# Patient Record
Sex: FEMALE | Race: WHITE | URBAN - METROPOLITAN AREA
[De-identification: names, ages, dates, MRNs, and addresses within clinical notes are randomized per-mention and may not be internally consistent; named-entity substitution may affect disease eponyms.]

---

## 2018-09-18 ENCOUNTER — NEW PATIENT (OUTPATIENT)
Dept: URBAN - METROPOLITAN AREA CLINIC 44 | Facility: CLINIC | Age: 58
End: 2018-09-18
Payer: COMMERCIAL

## 2018-09-18 PROCEDURE — 92004 COMPRE OPH EXAM NEW PT 1/>: CPT | Performed by: OPTOMETRIST

## 2018-09-18 PROCEDURE — 92015 DETERMINE REFRACTIVE STATE: CPT | Performed by: OPTOMETRIST

## 2018-09-18 PROCEDURE — 92133 CPTRZD OPH DX IMG PST SGM ON: CPT | Performed by: OPTOMETRIST

## 2018-09-18 ASSESSMENT — INTRAOCULAR PRESSURE
OS: 24
OD: 23
OD: 21
OS: 21

## 2018-09-18 ASSESSMENT — VISUAL ACUITY
OD: 20/20
OS: 20/20

## 2018-09-18 ASSESSMENT — KERATOMETRY
OS: 44.75
OD: 44.75

## 2019-04-12 ENCOUNTER — FOLLOW UP ESTABLISHED (OUTPATIENT)
Dept: URBAN - METROPOLITAN AREA CLINIC 44 | Facility: CLINIC | Age: 59
End: 2019-04-12
Payer: COMMERCIAL

## 2019-04-12 PROCEDURE — 92012 INTRM OPH EXAM EST PATIENT: CPT | Performed by: OPTOMETRIST

## 2019-04-12 PROCEDURE — 92083 EXTENDED VISUAL FIELD XM: CPT | Performed by: OPTOMETRIST

## 2019-04-12 PROCEDURE — 76514 ECHO EXAM OF EYE THICKNESS: CPT | Performed by: OPTOMETRIST

## 2019-04-12 ASSESSMENT — INTRAOCULAR PRESSURE
OS: 15
OD: 22
OD: 15
OS: 23

## 2019-10-15 ENCOUNTER — FOLLOW UP ESTABLISHED (OUTPATIENT)
Dept: URBAN - METROPOLITAN AREA CLINIC 44 | Facility: CLINIC | Age: 59
End: 2019-10-15
Payer: COMMERCIAL

## 2019-10-15 DIAGNOSIS — H25.13 AGE-RELATED NUCLEAR CATARACT, BILATERAL: ICD-10-CM

## 2019-10-15 DIAGNOSIS — G43.B0 OPHTHALMOPLEGIC MIGRAINE, NOT INTRACTABLE: ICD-10-CM

## 2019-10-15 PROCEDURE — 92014 COMPRE OPH EXAM EST PT 1/>: CPT | Performed by: OPTOMETRIST

## 2019-10-15 PROCEDURE — 92134 CPTRZ OPH DX IMG PST SGM RTA: CPT | Performed by: OPTOMETRIST

## 2019-10-15 PROCEDURE — 92133 CPTRZD OPH DX IMG PST SGM ON: CPT | Performed by: OPTOMETRIST

## 2019-10-15 ASSESSMENT — VISUAL ACUITY
OD: 20/25
OS: 20/25

## 2019-10-15 ASSESSMENT — INTRAOCULAR PRESSURE
OD: 24
OS: 26

## 2019-10-15 ASSESSMENT — KERATOMETRY
OS: 44.38
OD: 44.88

## 2020-05-13 ENCOUNTER — FOLLOW UP ESTABLISHED (OUTPATIENT)
Dept: URBAN - METROPOLITAN AREA CLINIC 44 | Facility: CLINIC | Age: 60
End: 2020-05-13
Payer: COMMERCIAL

## 2020-05-13 PROCEDURE — 92083 EXTENDED VISUAL FIELD XM: CPT | Performed by: OPTOMETRIST

## 2020-05-13 PROCEDURE — 92012 INTRM OPH EXAM EST PATIENT: CPT | Performed by: OPTOMETRIST

## 2020-05-13 ASSESSMENT — INTRAOCULAR PRESSURE
OS: 22
OD: 22

## 2020-11-11 ENCOUNTER — FOLLOW UP ESTABLISHED (OUTPATIENT)
Dept: URBAN - METROPOLITAN AREA CLINIC 44 | Facility: CLINIC | Age: 60
End: 2020-11-11
Payer: COMMERCIAL

## 2020-11-11 DIAGNOSIS — H04.123 TEAR FILM INSUFFICIENCY OF BILATERAL LACRIMAL GLANDS: ICD-10-CM

## 2020-11-11 PROCEDURE — 92015 DETERMINE REFRACTIVE STATE: CPT | Performed by: OPTOMETRIST

## 2020-11-11 PROCEDURE — 92014 COMPRE OPH EXAM EST PT 1/>: CPT | Performed by: OPTOMETRIST

## 2020-11-11 PROCEDURE — 92133 CPTRZD OPH DX IMG PST SGM ON: CPT | Performed by: OPTOMETRIST

## 2020-11-11 ASSESSMENT — KERATOMETRY
OD: 44.88
OS: 44.50

## 2020-11-11 ASSESSMENT — VISUAL ACUITY
OD: 20/20
OS: 20/20

## 2020-11-11 ASSESSMENT — INTRAOCULAR PRESSURE
OD: 19
OS: 20

## 2021-10-27 ENCOUNTER — OFFICE VISIT (OUTPATIENT)
Dept: URBAN - METROPOLITAN AREA CLINIC 44 | Facility: CLINIC | Age: 61
End: 2021-10-27
Payer: COMMERCIAL

## 2021-10-27 DIAGNOSIS — H40.053 OCULAR HYPERTENSION, BILATERAL: Primary | ICD-10-CM

## 2021-10-27 DIAGNOSIS — H52.4 PRESBYOPIA: ICD-10-CM

## 2021-10-27 PROCEDURE — 92014 COMPRE OPH EXAM EST PT 1/>: CPT | Performed by: OPTOMETRIST

## 2021-10-27 PROCEDURE — 92133 CPTRZD OPH DX IMG PST SGM ON: CPT | Performed by: OPTOMETRIST

## 2021-10-27 ASSESSMENT — VISUAL ACUITY
OS: 20/20
OD: 20/20

## 2021-10-27 ASSESSMENT — INTRAOCULAR PRESSURE
OD: 22
OS: 20

## 2021-10-27 NOTE — IMPRESSION/PLAN
Impression: Ocular hypertension, bilateral Plan: Small CDR, healthy rim OU Family hx: none Pachymetry: [id number] IOP: 22/20 Tmax: 24/26 OCT RNFL (10/27/21): wnl OU
HVF (05/13/20): trace scatter OU Did not start gtts today.  RTC 1 year for complete exam + possible OCT RNFL

## 2021-10-27 NOTE — IMPRESSION/PLAN
Impression: Presbyopia: H52.4. Plan: Release new glasses prescription -- prescription virtually same as current glasses, so patient should not expect significant improvement in vision with new glasses.

## 2022-10-27 ENCOUNTER — OFFICE VISIT (OUTPATIENT)
Dept: URBAN - METROPOLITAN AREA CLINIC 44 | Facility: CLINIC | Age: 62
End: 2022-10-27
Payer: COMMERCIAL

## 2022-10-27 DIAGNOSIS — H40.053 OCULAR HYPERTENSION, BILATERAL: ICD-10-CM

## 2022-10-27 DIAGNOSIS — H04.123 TEAR FILM INSUFFICIENCY OF BILATERAL LACRIMAL GLANDS: Primary | ICD-10-CM

## 2022-10-27 DIAGNOSIS — H52.4 PRESBYOPIA: ICD-10-CM

## 2022-10-27 DIAGNOSIS — H25.13 AGE-RELATED NUCLEAR CATARACT, BILATERAL: ICD-10-CM

## 2022-10-27 PROCEDURE — 92133 CPTRZD OPH DX IMG PST SGM ON: CPT | Performed by: OPTOMETRIST

## 2022-10-27 PROCEDURE — 99214 OFFICE O/P EST MOD 30 MIN: CPT | Performed by: OPTOMETRIST

## 2022-10-27 ASSESSMENT — INTRAOCULAR PRESSURE
OD: 22
OS: 23

## 2022-10-27 ASSESSMENT — VISUAL ACUITY
OS: 20/20
OD: 20/20

## 2022-10-27 NOTE — IMPRESSION/PLAN
Impression: Ocular hypertension, bilateral Plan: Small CDR, healthy rim OU Family hx: none Pachymetry: [id number] IOP: 22/23 Tmax: 24/26 OCT RNFL (10/27/22): wnl OU
HVF (05/13/20): trace scatter OU Did not start gtts today.  RTC 1 year for complete exam + possible OCT RNFL

## 2024-03-28 ENCOUNTER — OFFICE VISIT (OUTPATIENT)
Dept: URBAN - METROPOLITAN AREA CLINIC 44 | Facility: CLINIC | Age: 64
End: 2024-03-28
Payer: COMMERCIAL

## 2024-03-28 DIAGNOSIS — H52.4 PRESBYOPIA: ICD-10-CM

## 2024-03-28 DIAGNOSIS — H25.13 AGE-RELATED NUCLEAR CATARACT, BILATERAL: ICD-10-CM

## 2024-03-28 DIAGNOSIS — H04.123 TEAR FILM INSUFFICIENCY OF BILATERAL LACRIMAL GLANDS: Primary | ICD-10-CM

## 2024-03-28 DIAGNOSIS — H40.053 OCULAR HYPERTENSION, BILATERAL: ICD-10-CM

## 2024-03-28 PROCEDURE — 99214 OFFICE O/P EST MOD 30 MIN: CPT | Performed by: OPTOMETRIST

## 2024-03-28 PROCEDURE — 92133 CPTRZD OPH DX IMG PST SGM ON: CPT | Performed by: OPTOMETRIST

## 2024-03-28 ASSESSMENT — INTRAOCULAR PRESSURE
OD: 16
OS: 15

## 2024-03-28 ASSESSMENT — VISUAL ACUITY
OS: 20/20
OD: 20/20

## 2024-03-28 ASSESSMENT — KERATOMETRY
OD: 44.88
OS: 44.63

## 2025-03-31 ENCOUNTER — OFFICE VISIT (OUTPATIENT)
Dept: URBAN - METROPOLITAN AREA CLINIC 44 | Facility: CLINIC | Age: 65
End: 2025-03-31
Payer: COMMERCIAL

## 2025-03-31 DIAGNOSIS — H04.123 TEAR FILM INSUFFICIENCY OF BILATERAL LACRIMAL GLANDS: Primary | ICD-10-CM

## 2025-03-31 DIAGNOSIS — H52.4 PRESBYOPIA: ICD-10-CM

## 2025-03-31 DIAGNOSIS — H25.13 AGE-RELATED NUCLEAR CATARACT, BILATERAL: ICD-10-CM

## 2025-03-31 DIAGNOSIS — H40.053 OCULAR HYPERTENSION, BILATERAL: ICD-10-CM

## 2025-03-31 PROCEDURE — 92133 CPTRZD OPH DX IMG PST SGM ON: CPT | Performed by: OPTOMETRIST

## 2025-03-31 PROCEDURE — 92014 COMPRE OPH EXAM EST PT 1/>: CPT | Performed by: OPTOMETRIST

## 2025-03-31 ASSESSMENT — VISUAL ACUITY
OS: 20/20
OD: 20/20

## 2025-03-31 ASSESSMENT — INTRAOCULAR PRESSURE
OD: 11
OS: 11

## 2025-03-31 ASSESSMENT — KERATOMETRY
OD: 49.38
OS: 44.88